# Patient Record
Sex: FEMALE | Race: AMERICAN INDIAN OR ALASKA NATIVE
[De-identification: names, ages, dates, MRNs, and addresses within clinical notes are randomized per-mention and may not be internally consistent; named-entity substitution may affect disease eponyms.]

---

## 2020-09-14 ENCOUNTER — HOSPITAL ENCOUNTER (EMERGENCY)
Dept: HOSPITAL 5 - ED | Age: 40
Discharge: HOME | End: 2020-09-14
Payer: COMMERCIAL

## 2020-09-14 VITALS — SYSTOLIC BLOOD PRESSURE: 126 MMHG | DIASTOLIC BLOOD PRESSURE: 57 MMHG
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
## 2020-09-14 DIAGNOSIS — Z88.8: ICD-10-CM

## 2020-09-14 DIAGNOSIS — F17.200: ICD-10-CM
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
## 2020-09-14 DIAGNOSIS — Z79.899: ICD-10-CM

## 2020-09-14 DIAGNOSIS — S82.401A: Primary | ICD-10-CM

## 2020-09-14 DIAGNOSIS — Z98.890: ICD-10-CM
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
## 2020-09-14 NOTE — EMERGENCY DEPARTMENT REPORT
ED Lower Extremity HPI





- General


Chief Complaint: Extremity Injury, Lower


Stated Complaint: SWOLLEN ANKLE


Time Seen by Provider: 20 11:18


Source: patient


Mode of arrival: Wheelchair


Limitations: No Limitations





- History of Present Illness


Initial Comments: 





This is a 40-year-old female nontoxic, well nourished in appearance, no acute 

signs of distress presents to the ED with c/o of right ankle pain x1 day.  

Patient stated had an injury to right ankle while walking downstairs and twisted

it.  Patient denies any new trauma or injuries.  Denies joint swelling, redness.

 Agrees to abnormal gait and decreased ROM due to pain.  Denies any fever, 

chills, nausea, vomiting, headache, stiff neck, chest pain or shortness of 

breath.  Patient denies any numbness or tingling.  Denies any allergies.


MD Complaint: ankle injury


-: days(s)


Injury: Ankle: Right


Severity: mild


Severity scale (0 -10): 8


Improves With: immobilization


Worsens With: weight bearing, movement, palpation


Associated Symptoms: swelling, able to partially bear weight.  denies: snap/pop 

sensation, numbness, tingling, unable to bear weight





- Related Data


                                  Previous Rx's











 Medication  Instructions  Recorded  Last Taken  Type


 


Ondansetron [Zofran ODT TAB] 8 mg PO Q8HR PRN #10 tab.rapdis 10/17/15 Unknown Rx


 


Ondansetron [Zofran Odt] 4 mg PO Q8HR #5 tab.rapdis 10/24/15 Unknown Rx


 


Acetaminophen/Codeine [Tylenol 1 tab PO Q6H PRN #12 tab 20 Unknown Rx





/Codeine # 3 tab]    











                                    Allergies











Allergy/AdvReac Type Severity Reaction Status Date / Time


 


pseudoephedrine Allergy  Unknown Verified 14 18:53














ED Review of Systems


ROS: 


Stated complaint: SWOLLEN ANKLE


Other details as noted in HPI





Constitutional: denies: chills, fever


Eyes: denies: eye pain, eye discharge, vision change


ENT: denies: ear pain, throat pain


Respiratory: denies: cough, shortness of breath, wheezing


Cardiovascular: denies: chest pain, palpitations


Endocrine: no symptoms reported


Gastrointestinal: denies: abdominal pain, nausea, diarrhea


Genitourinary: denies: urgency, dysuria, discharge


Musculoskeletal: denies: back pain, joint swelling, arthralgia


Skin: denies: rash, lesions


Neurological: denies: headache, weakness, paresthesias


Psychiatric: denies: anxiety, depression


Hematological/Lymphatic: denies: easy bleeding, easy bruising





ED Past Medical Hx





- Past Medical History


Hx Congestive Heart Failure: No


Hx Diabetes: No


Hx Asthma: No


Hx COPD: No


Additional medical history: Childbirth





- Surgical History


Past Surgical History?: Yes


Additional Surgical History:  x 1





- Social History


Smoking Status: Current Every Day Smoker


Substance Use Type: Alcohol





- Medications


Home Medications: 


                                Home Medications











 Medication  Instructions  Recorded  Confirmed  Last Taken  Type


 


Ondansetron [Zofran ODT TAB] 8 mg PO Q8HR PRN #10 tab.rapdis 10/17/15  Unknown 

Rx


 


Ondansetron [Zofran Odt] 4 mg PO Q8HR #5 tab.rapdis 10/24/15  Unknown Rx


 


Acetaminophen/Codeine [Tylenol 1 tab PO Q6H PRN #12 tab 20  Unknown Rx





/Codeine # 3 tab]     














ED Physical Exam





- General


Limitations: No Limitations


General appearance: alert, in no apparent distress





- Head


Head exam: Present: atraumatic, normocephalic





- Eye


Eye exam: Present: normal appearance





- Neck


Neck exam: Present: normal inspection, full ROM





- Respiratory


Respiratory exam: Absent: respiratory distress





- Cardiovascular


Cardiovascular Exam: Present: regular rate





- Extremities Exam


Extremities exam: Present: full ROM, tenderness, normal capillary refill.  

Absent: joint swelling, calf tenderness





- Expanded Lower Extremity Exam


  ** Right


Hip exam: Present: normal inspection, full ROM.  Absent: tenderness, swelling


Upper Leg exam: Present: normal inspection, full ROM.  Absent: tenderness, 

swelling


Knee exam: Present: normal inspection, full ROM.  Absent: tenderness, swelling


Lower Leg exam: Present: normal inspection, full ROM.  Absent: tenderness, 

swelling


Ankle exam: Present: full ROM, tenderness, swelling, ecchymosis.  Absent: 

abrasion, laceration, deformity, crepidus, dislocation, erythema, anterior draw 

sign


Foot/Toe exam: Present: normal inspection, full ROM.  Absent: tenderness, 

swelling


Neuro vascular tendon exam: Present: no vascular compromise


Gait: Positive: observed and limited by pain





- Back Exam


Back exam: Present: normal inspection, full ROM.  Absent: tenderness, CVA 

tenderness (R), CVA tenderness (L), muscle spasm, paraspinal tenderness, 

vertebral tenderness, rash noted





- Neurological Exam


Neurological exam: Present: alert, oriented X3





- Psychiatric


Psychiatric exam: Present: normal affect, normal mood





- Skin


Skin exam: Present: warm, dry, intact, normal color.  Absent: rash





ED Course


                                   Vital Signs











  20





  11:16


 


Temperature 97.9 F


 


Pulse Rate 59 L


 


Respiratory 16





Rate 


 


Blood Pressure 126/57





[Right] 


 


O2 Sat by Pulse 98





Oximetry 














- Reevaluation(s)


Reevaluation #1: 





20 11:21


Patient is speaking in full sentences with no signs of distress noted.





ED Lower Extremity MDM





- Medical Decision Making





This is a 40-year-old female that presents with right ankle fracture  Patient is

 stable and was examined by me.  Educated on RICE therapy.  Provided jazmine 

splint and  crutches and was educated by RN how to use crutches.  Post splint: 

neuroascular intact, cap refill <2 seconds, denies being too tight, normal 

color, and normal sensatin.  Patient was instructed to Follow-up with a 

orthopedic doctor in 3-5 days or if symptoms worsen and continue return to 

emergency room as soon as possible.  At time of discharge, the patient does not 

seem toxic or ill in appearance.  No acute signs of distress noted.  Patient 

agrees to discharge treatment plan of care.  No further questions noted by the 

patient.





Critical care attestation.: 


If time is entered above; I have spent that time in minutes in the direct care 

of this critically ill patient, excluding procedure time.








ED Disposition


Clinical Impression: 


Right fibular fracture


Qualifiers:


 Encounter type: initial encounter Fibula location: distal Fracture type: closed

 Fracture morphology: unspecified fracture morphology Qualified Code(s): 

S82.831A - Other fracture of upper and lower end of right fibula, initial 

encounter for closed fracture





Disposition: DC-01 TO HOME OR SELFCARE


Is pt being admited?: No


Does the pt Need Aspirin: No


Condition: Stable


Instructions:  Ankle Fracture (ED), Splint Care (ED), Crutch Instructions (ED), 

RICE Therapy (ED), Acetaminophen/Codeine (By mouth)


Additional Instructions: 


Follow-up with a orthopedic doctor in 3-5 days or if symptoms worsen and 

continue return to emergency room as soon as possible.


Prescriptions: 


Acetaminophen/Codeine [Tylenol /Codeine # 3 tab] 1 tab PO Q6H PRN #12 tab


 PRN Reason: Pain , Severe (7-10)


Referrals: 


PRIMARY CARE,MD [Referring] - 3-5 Days


SHAZIA GUTIERREZ MD [Staff Physician] - 3-5 Days


Forms:  Work/School Release Form(ED)

## 2020-09-14 NOTE — XRAY REPORT
RIGHT ANKLE 3 VIEWS



INDICATION:  ankle pain. 



COMPARISON: None.



IMPRESSION:  There is a comminuted mildly displaced fracture through the distal fibula just superior 
to the ankle joint. The distal tibia and talar dome are grossly intact.  There is posterolateral subl
uxation of the talus suggesting ligamentous injury at the ankle joint. The visualized hindfoot is int
act. There is severe diffuse soft tissue swelling.



Signer Name: Wilber Hollingsworth Jr, MD 

Signed: 9/14/2020 12:23 PM

Workstation Name: [id number]